# Patient Record
(demographics unavailable — no encounter records)

---

## 2024-12-30 NOTE — HISTORY OF PRESENT ILLNESS
[de-identified] : This is a 67 year F presenting to the office c/o ongoing Right shoulder pain following workplace injury on 10/28/24. 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 Patient reports that she was seated, and went to stand and feel on her Right side with immediate pain. Patient was first seen by Dr. Patel at [facility] where she had US, Xray and MRI of the Right shoulder, patient presents with imaging of the MRI at this time only. Patient reports significant pain in the anterior shoulder as well as the lateral shoulder. Patient has tried a sling with minimal relief. Patient has tried lidocaine patches with limited relief. Patient denies any numbness/tingling at this time and reports that since injury she noted no significant bruising.

## 2024-12-30 NOTE — DISCUSSION/SUMMARY
[de-identified] : This is a 67 year F presenting to the office c/o ongoing Right shoulder pain following workplace injury on 10/28/24. 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 Patient reports that she was seated, and went to stand and feel on her Right side with immediate pain. Patient was first seen by Dr. Patel at [facility] where she had US, Xray and MRI of the Right shoulder, patient presents with imaging of the MRI at this time only.  Xrays completed in office today and personally reviewed, show no significant bony abnormalities, no significant degenerative changes or high riding humeral head. MRI performed at [facility] and personally reviewed today show full thickness rotator cuff tear with associated atrophy.   Pt has a full thickness rotator cuff tear as injury to the biceps-labral complex and continues to report pain and weakness despite more than 3 months of conservative treatment including focused HEP/therapy, injections, anti-inflammatory medication and activity modification. The patient is interested in pursuing surgical treatment. We had a lengthy discussion about the nature of arthroscopic rotator cuff repair surgery including the likelihood of addressing other pathology with arthroscopic vs mini-open biceps tenodesis, subacromial decompression, and extensive debridement of any pathologic tissue encountered at the time of surgery. I discussed the risks and benefits of both operative and non-operative treatment. I explained in detail the postoperative recovery including the duration of sling use and expectation for postoperative physical therapy. Explained that there is no guarantee however there is a high likelihood of functional improvement and pain relief. The patient understood all this was discussed. The patient is indicated for a RIGHT shoulder arthroscopic rotator cuff repair, biceps tenodesis and subacromial decompression.  At this time the patient would like to delay surgical intervention for 3-4 months, discussed that with delay there is the risk of progression of muscle atrophy and retraction of the muscle.   All patient questions and concerns were answered to patients' satisfaction, patient will follow up in 6 weeks.  (Right) Rotator Cuff Tear Discussion for Patient Requesting Surgery. Pt has significant rotator cuff tear as injury to the biceps-labral complex and continues to report pain and weakness despite appropriate conservative treatment including focused HEP/therapy, injections, anti-inflammatory medication and activity modification. The patient is interested in pursuing surgical treatment. We had a lengthy discussion about the nature of arthroscopic rotator cuff repair surgery including the likelihood of addressing other pathology with arthroscopic Vs mini-open biceps tenodesis, subacromial decompression, and extensive debridement of any pathologic tissue encountered at the time of surgery.  I discussed the risks and benefits of both operative and non-operative treatment. I explained in detail the postoperative recovery including the duration of sling use and expectation for postoperative physical therapy. Explained that there is no guarantee however there is a high likelihood of functional improvement and pain relief. The patient understood all this was discussed.  The patient is indicated for a Right shoulder arthroscopic rotator cuff repair, biceps tenodesis, and subacromial decompression.  * Surgery: Considering patient has failed all conservative treatment we are requesting authorization for: -	Right shoulder arthroscopic rotator cuff repair (CPT code 75421), biceps retrieval with tenodesis (CPT 64023),  debridement (CPT 78617) and Subacromial decompression (98076),  -	 Pt would like surgery (after approval) The patient will require a [place] Arc 2.0 brace, cryotherapy, and 12 weeks of post-operative physical therapy. The patient will require a medical clearance  The doctor will require the [employer] representative, one hour, and one assistant.   (1) We discussed a comprehensive treatment plans that included a prescription management plan involving the use of prescription strength medications to include Ibuprofen 600-800 mg TID, versus 500-650 mg Tylenol. We also discussed prescribing topical diclofenac (Voltaren gel) as well as once daily MeloFxicam 15 mg.   (2) The patient has More Than One chronic injuries/illnesses as outlined, discussed, and documented by ICD 10 codes listed, as well as the HPI and Plan section.  There is a moderate risk of morbidity with further treatment, especially from use of prescription strength medications and possible side effects of these medications which include upset stomach and cardiac/renal issues with long term use were discussed.   (3) I recommended that the patient follow-up with their medical physician to discuss any significant specific potential issues with long term use such as interactions with current medications or with exacerbation of underlying medical morbidities.      Attestation: I, Natalio Romero, attest that this documentation has been prepared under the direction and in the presence of Provider Mark Jones MD. The documentation recorded by the scribe, in my presence, accurately reflects the service I personally performed, and the decisions made by me with my edits as appropriate. Mark Jones MD

## 2024-12-30 NOTE — WORK
[Sprain/Strain] : sprain/strain [Torn Ligament/Tendon/Muscle] : torn ligament, tendon or muscle [Was the competent medical cause of the injury] : was the competent medical cause of the injury [Are consistent with the injury] : are consistent with the injury [Consistent with my objective findings] : consistent with my objective findings [Severe Partial] : severe partial

## 2024-12-30 NOTE — DATA REVIEWED
[FreeTextEntry1] : Personally reviewed MRI imaging completed at [facility] 11/2024. Findings as follows:  Full thickness rotator cuff tear with notable atrophy of supraspinatus.

## 2024-12-30 NOTE — PHYSICAL EXAM
[Right] : right shoulder [There are no fractures, subluxations or dislocations. No significant abnormalities are seen] : There are no fractures, subluxations or dislocations. No significant abnormalities are seen [Type 2 acromion] : Type 2 acromion [No bony abnormalities] : No bony abnormalities [de-identified] : Constitutional: The general appearance of the patient is well developed, well nourished, no deformities and well groomed. Normal      Gait: Gait and function is as follows: normal gait.    Skin: Head and neck visualized skin is normal. Left upper extremity visualized skin is normal. Right upper extremity visualized skin is normal. Thoracic Skin of the thoracic spine shows visualized skin is normal.    Cardiovascular: palpable radial pulse bilaterally, good capillary refill in digits of the bilateral upper extremities and no temperature or color changes in the bilateral upper extremities.    Lymphatic: Normal Palpation of lymph nodes in the cervical.    Neurologic: fine motor control in the bilateral upper extremities is intact. Deep Tendon Reflexes in Upper and Lower Extremities Negative Recinos's in the bilateral upper extremities. The patient is oriented to time, place and person. Sensation to light touch intact in the bilateral upper extremities. Mood and Affect is normal.    LEFT Shoulder:  Inspection of the shoulder/upper arm is as follows: There is a full, pain-free, stable range of motion of the shoulder with normal strength and no tenderness to palpation.    RIGHT Shoulder: Inspection of the shoulder/upper arm is as follows: no scapula winging, no biceps deformity and no AC joint deformity. Palpation of the shoulder/upper arm is as follows: There is tenderness at the proximal biceps tendon. Range of motion of the shoulder is as follows: Pain with internal rotation, external rotation, abduction and forward flexion. Strength of the shoulder is as follows: Supraspinatus 4/5. External Rotation 4/5. Internal Rotation 4/5. Deltoid 5/5 Ligament Stability and Special Tests of the shoulder is as follows: Neer test is positive. Montes De Oca' test is positive. Speed's test is positive.    Neck:  Inspection / Palpation of the cervical spine is as follows: mild paracervical tenderness. Range of motion of the cervical spine is as follows: moderately decreased range of motion of the cervical spine. Stability testing for the cervical spine is as follows Stable range of motion.    Back, including spine: Inspection / Palpation of the thoracic/lumbar spine is as follows: There is a full, pain free, stable range of motion of the thoracic spine with a normal tone and not tenderness to palpation

## 2025-02-12 NOTE — HISTORY OF PRESENT ILLNESS
[de-identified] : This is a 67 year F presenting to the office c/o ongoing Right shoulder pain following workplace injury on 10/28/24. 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 Patient was first seen by Dr. Patel at [facility] where she had US, Xray and MRI of the Right shoulder, patient presents with imaging of the MRI at this time only. Patient reports significant pain in the anterior shoulder as well as the lateral shoulder. Patient has tried a sling with minimal relief. Patient has tried lidocaine patches with limited relief. Patient denies any numbness/tingling at this time and reports that since injury she noted no significant bruising.   2/12/25: Patient here for right shoulder pain. Patient reports surgery was recently granted through workers compensation. Pt wishes to discuss postoperative restrictions with surgery.  Patient reports that her left shoulder is now beginning to bother her as result of using it more since her right shoulder was injured.

## 2025-02-12 NOTE — DATA REVIEWED
[FreeTextEntry1] : Personally reviewed MRI imaging completed at [facility] 11/2024. Findings as follows:  Full thickness rotator cuff tear with notable atrophy of supraspinatus.

## 2025-02-12 NOTE — PHYSICAL EXAM
[Right] : right shoulder [There are no fractures, subluxations or dislocations. No significant abnormalities are seen] : There are no fractures, subluxations or dislocations. No significant abnormalities are seen [Type 2 acromion] : Type 2 acromion [No bony abnormalities] : No bony abnormalities [de-identified] : Constitutional: The general appearance of the patient is well developed, well nourished, no deformities and well groomed. Normal      Gait: Gait and function is as follows: normal gait.    Skin: Head and neck visualized skin is normal. Left upper extremity visualized skin is normal. Right upper extremity visualized skin is normal. Thoracic Skin of the thoracic spine shows visualized skin is normal.    Cardiovascular: palpable radial pulse bilaterally, good capillary refill in digits of the bilateral upper extremities and no temperature or color changes in the bilateral upper extremities.    Lymphatic: Normal Palpation of lymph nodes in the cervical.    Neurologic: fine motor control in the bilateral upper extremities is intact. Deep Tendon Reflexes in Upper and Lower Extremities Negative Recinos's in the bilateral upper extremities. The patient is oriented to time, place and person. Sensation to light touch intact in the bilateral upper extremities. Mood and Affect is normal.    LEFT Shoulder:  Inspection of the shoulder/upper arm is as follows: pain with resisted ER/IR. Pain with abduction   RIGHT Shoulder: Inspection of the shoulder/upper arm is as follows: no scapula winging, no biceps deformity and no AC joint deformity. Palpation of the shoulder/upper arm is as follows: There is tenderness at the proximal biceps tendon. Range of motion of the shoulder is as follows: Pain with internal rotation, external rotation, abduction and forward flexion. Strength of the shoulder is as follows: Supraspinatus 4/5. External Rotation 4/5. Internal Rotation 4/5. Deltoid 5/5 Ligament Stability and Special Tests of the shoulder is as follows: Neer test is positive. Montes De Oca' test is positive. Speed's test is positive.    Neck:  Inspection / Palpation of the cervical spine is as follows: mild paracervical tenderness. Range of motion of the cervical spine is as follows: moderately decreased range of motion of the cervical spine. Stability testing for the cervical spine is as follows Stable range of motion.    Back, including spine: Inspection / Palpation of the thoracic/lumbar spine is as follows: There is a full, pain free, stable range of motion of the thoracic spine with a normal tone and not tenderness to palpation

## 2025-02-12 NOTE — DISCUSSION/SUMMARY
[de-identified] : This is a 67 year F presenting to the office c/o ongoing Right shoulder pain following workplace injury on 10/28/24.  I personally reviewed XR, show no significant bony abnormalities, no significant degenerative changes or high riding humeral head. [facility] performed at [facility] and personally reviewed show full thickness rotator cuff tear approximately 1 cm x 1 cm of the supraspinatus and upper one third of the subscapularis.  We did explain that full-thickness tears specifically in this location tend to progress in size and therefore he could become unfixable at some point in the future yielding permanent impairment.  Strongly advised that she undergo arthroscopic rotator cuff repair and biceps tenodesis to prevent rotator cuff muscle atrophy and the development of an irreparable rotator cuff tear. Patient expresses some apprehension and anxiety related to the procedure. Patient to consider whether or not she should proceed.  Patient reports her left shoulder has become painful since compensating for the right shoulder which is causally related to the work injury on 10/28/24 she reports difficulty putting on her coat and other activities of daily living as result of her increased left shoulder pain..  Further workup would be provided if this is established on the case.    Pt has a full thickness rotator cuff tear as injury to the biceps-labral complex and continues to report pain and weakness despite more than 3 months of conservative treatment including focused HEP/therapy, injections, anti-inflammatory medication and activity modification. The patient is interested in pursuing surgical treatment. We had a lengthy discussion about the nature of arthroscopic rotator cuff repair surgery including the likelihood of addressing other pathology with arthroscopic vs mini-open biceps tenodesis, subacromial decompression, and extensive debridement of any pathologic tissue encountered at the time of surgery. I discussed the risks and benefits of both operative and non-operative treatment. I explained in detail the postoperative recovery including the duration of sling use and expectation for postoperative physical therapy. Explained that there is no guarantee however there is a high likelihood of functional improvement and pain relief. The patient understood all this was discussed. The patient is indicated for a RIGHT shoulder arthroscopic rotator cuff repair, biceps tenodesis and subacromial decompression.  At this time the patient would like to delay surgical intervention for 3-4 months, discussed that with delay there is the risk of progression of muscle atrophy and retraction of the muscle.   All patient questions and concerns were answered to patients' satisfaction, patient will follow up in 6 weeks.  (Right) Rotator Cuff Tear Discussion for Patient Requesting Surgery. Pt has significant rotator cuff tear as injury to the biceps-labral complex and continues to report pain and weakness despite appropriate conservative treatment including focused HEP/therapy, injections, anti-inflammatory medication and activity modification. The patient is interested in pursuing surgical treatment. We had a lengthy discussion about the nature of arthroscopic rotator cuff repair surgery including the likelihood of addressing other pathology with arthroscopic Vs mini-open biceps tenodesis, subacromial decompression, and extensive debridement of any pathologic tissue encountered at the time of surgery.  I discussed the risks and benefits of both operative and non-operative treatment. I explained in detail the postoperative recovery including the duration of sling use and expectation for postoperative physical therapy. Explained that there is no guarantee however there is a high likelihood of functional improvement and pain relief. The patient understood all this was discussed.  The patient is indicated for a Right shoulder arthroscopic rotator cuff repair, biceps tenodesis, and subacromial decompression.  * Surgery: Considering patient has failed all conservative treatment we are requesting authorization for: -	Right shoulder arthroscopic rotator cuff repair (CPT code 45943), biceps retrieval with tenodesis (CPT 54654),  debridement (CPT 98888) and Subacromial decompression (88168),  -	 Pt would like surgery (after approval) The patient will require a [place] Arc 2.0 brace, cryotherapy, and 12 weeks of post-operative physical therapy. The patient will require a medical clearance  The doctor will require the [employer] representative, one hour, and one assistant.   (1) We discussed a comprehensive treatment plans that included a prescription management plan involving the use of prescription strength medications to include Ibuprofen 600-800 mg TID, versus 500-650 mg Tylenol. We also discussed prescribing topical diclofenac (Voltaren gel) as well as once daily MeloFxicam 15 mg.   (2) The patient has More Than One chronic injuries/illnesses as outlined, discussed, and documented by ICD 10 codes listed, as well as the HPI and Plan section.  There is a moderate risk of morbidity with further treatment, especially from use of prescription strength medications and possible side effects of these medications which include upset stomach and cardiac/renal issues with long term use were discussed.   (3) I recommended that the patient follow-up with their medical physician to discuss any significant specific potential issues with long term use such as interactions with current medications or with exacerbation of underlying medical morbidities.      Attestation: I, Rubi Moody, attest that this documentation has been prepared under the direction and in the presence of Provider Mark Jones MD. The documentation recorded by the scribe, in my presence, accurately reflects the service I personally performed, and the decisions made by me with my edits as appropriate. Mark Jones MD

## 2025-03-26 NOTE — HISTORY OF PRESENT ILLNESS
[de-identified] : This is a 67 year F presenting to the office c/o ongoing Right shoulder pain following workplace injury on 10/28/24. 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 Patient was first seen by Dr. Patel at [facility] where she had US, Xray and MRI of the Right shoulder, patient presents with imaging of the MRI at this time only. Patient reports significant pain in the anterior shoulder as well as the lateral shoulder. Patient has tried a sling with minimal relief. Patient has tried lidocaine patches with limited relief. Patient denies any numbness/tingling at this time and reports that since injury she noted no significant bruising.   2/12/25: Patient here for right shoulder pain. Patient reports surgery was recently granted through workers compensation. Pt wishes to discuss postoperative restrictions with surgery.  Patient reports that her left shoulder is now beginning to bother her as result of using it more since her right shoulder was injured.  3/26/25: Patient returns today for a follow-up of right shoulder pain.  She has known full-thickness rotator cuff tear as result of a work-related injury.  Patient does continue to note significant retroscapular pain as well.  Previously indicated for surgery and was granted authorization however patient wishes to continue with conservative management.

## 2025-03-26 NOTE — DATA REVIEWED
[FreeTextEntry1] : Personally reviewed MRI imaging completed at [facility] 11/2024. Findings as follows:  Full thickness rotator cuff tear with notable atrophy of supraspinatus.

## 2025-03-26 NOTE — PHYSICAL EXAM
[de-identified] : Constitutional: The general appearance of the patient is well developed, well nourished, no deformities and well groomed. Normal      Gait: Gait and function is as follows: normal gait.    Skin: Head and neck visualized skin is normal. Left upper extremity visualized skin is normal. Right upper extremity visualized skin is normal. Thoracic Skin of the thoracic spine shows visualized skin is normal.    Cardiovascular: palpable radial pulse bilaterally, good capillary refill in digits of the bilateral upper extremities and no temperature or color changes in the bilateral upper extremities.    Lymphatic: Normal Palpation of lymph nodes in the cervical.    Neurologic: fine motor control in the bilateral upper extremities is intact. Deep Tendon Reflexes in Upper and Lower Extremities Negative Recinos's in the bilateral upper extremities. The patient is oriented to time, place and person. Sensation to light touch intact in the bilateral upper extremities. Mood and Affect is normal.    LEFT Shoulder:  Inspection of the shoulder/upper arm is as follows: pain with resisted ER/IR. Pain with abduction   RIGHT Shoulder: Inspection of the shoulder/upper arm is as follows: no scapula winging, no biceps deformity and no AC joint deformity. Palpation of the shoulder/upper arm is as follows: There is tenderness at the proximal biceps tendon. Range of motion of the shoulder is as follows: Pain with internal rotation, external rotation, abduction and forward flexion. Strength of the shoulder is as follows: Supraspinatus 4/5. External Rotation 4/5. Internal Rotation 4/5. Deltoid 5/5 Ligament Stability and Special Tests of the shoulder is as follows: Neer test is positive. Montes De Oca' test is positive. Speed's test is positive.    Neck:  Inspection / Palpation of the cervical spine is as follows: mild paracervical tenderness. Range of motion of the cervical spine is as follows: moderately decreased range of motion of the cervical spine. Stability testing for the cervical spine is as follows Stable range of motion.    Back, including spine: Inspection / Palpation of the thoracic/lumbar spine is as follows: There is a full, pain free, stable range of motion of the thoracic spine with a normal tone and not tenderness to palpation

## 2025-03-26 NOTE — DISCUSSION/SUMMARY
[de-identified] : This is a 67 year F presenting to the office c/o ongoing Right shoulder pain following workplace injury on 10/28/24.  I personally reviewed XR, show no significant bony abnormalities, no significant degenerative changes or high riding humeral head. [facility] performed [facility] and personally reviewed show full thickness rotator cuff tear approximately 1 cm x 1 cm of the supraspinatus and upper one third of the subscapularis.  We did explain that full-thickness tears specifically in this location tend to progress in size and therefore he could become unfixable at some point in the future yielding permanent impairment.  Strongly advised that she undergo arthroscopic rotator cuff repair and biceps tenodesis to prevent rotator cuff muscle atrophy and the development of an irreparable rotator cuff tear. Patient expresses some apprehension and anxiety related to the procedure Including concern over anesthesia. At this point she wishes to continue with conservative management.   Patient reports her left shoulder has become painful since compensating for the right shoulder which is causally related to the work injury on 10/28/24 she reports difficulty putting on her coat and other activities of daily living as result of her increased left shoulder pain..  Further workup would be provided  Including x-ray and MRI if this is established on the case.   Pt has a full thickness rotator cuff tear as injury to the biceps-labral complex and continues to report pain and weakness despite more than 3 months of conservative treatment including focused HEP/therapy, injections, anti-inflammatory medication and activity modification. The patient is interested in pursuing surgical treatment. We had a lengthy discussion about the nature of arthroscopic rotator cuff repair surgery including the likelihood of addressing other pathology with arthroscopic vs mini-open biceps tenodesis, subacromial decompression, and extensive debridement of any pathologic tissue encountered at the time of surgery. I discussed the risks and benefits of both operative and non-operative treatment. I explained in detail the postoperative recovery including the duration of sling use and expectation for postoperative physical therapy. Explained that there is no guarantee however there is a high likelihood of functional improvement and pain relief. The patient understood all this was discussed. The patient is indicated for a RIGHT shoulder arthroscopic rotator cuff repair, biceps tenodesis and subacromial decompression.  At this time the patient would like to delay surgical intervention for 3-4 months, discussed that with delay there is the risk of progression of muscle atrophy and retraction of the muscle.   All patient questions and concerns were answered to patients' satisfaction, patient will follow up in 6 weeks.  (Right) Rotator Cuff Tear Discussion for Patient Requesting Surgery. Pt has significant rotator cuff tear as injury to the biceps-labral complex and continues to report pain and weakness despite appropriate conservative treatment including focused HEP/therapy, injections, anti-inflammatory medication and activity modification. The patient is interested in pursuing surgical treatment. We had a lengthy discussion about the nature of arthroscopic rotator cuff repair surgery including the likelihood of addressing other pathology with arthroscopic Vs mini-open biceps tenodesis, subacromial decompression, and extensive debridement of any pathologic tissue encountered at the time of surgery.  I discussed the risks and benefits of both operative and non-operative treatment. I explained in detail the postoperative recovery including the duration of sling use and expectation for postoperative physical therapy. Explained that there is no guarantee however there is a high likelihood of functional improvement and pain relief. The patient understood all this was discussed.  The patient is indicated for a Right shoulder arthroscopic rotator cuff repair, biceps tenodesis, and subacromial decompression.  * Surgery: Considering patient has failed all conservative treatment we are requesting authorization for: -	Right shoulder arthroscopic rotator cuff repair (CPT code 36201), biceps retrieval with tenodesis (CPT 38078),  debridement (CPT 36216) and Subacromial decompression (51039),  -	 Pt would like surgery (after approval) The patient will require a [place] Arc 2.0 brace, cryotherapy, and 12 weeks of post-operative physical therapy. The patient will require a medical clearance  The doctor will require the [facility] representative, one hour, and one assistant.   (1) We discussed a comprehensive treatment plans that included a prescription management plan involving the use of prescription strength medications to include Ibuprofen 600-800 mg TID, versus 500-650 mg Tylenol. We also discussed prescribing topical diclofenac (Voltaren gel) as well as once daily MeloFxicam 15 mg.   (2) The patient has More Than One chronic injuries/illnesses as outlined, discussed, and documented by ICD 10 codes listed, as well as the HPI and Plan section.  There is a moderate risk of morbidity with further treatment, especially from use of prescription strength medications and possible side effects of these medications which include upset stomach and cardiac/renal issues with long term use were discussed.   (3) I recommended that the patient follow-up with their medical physician to discuss any significant specific potential issues with long term use such as interactions with current medications or with exacerbation of underlying medical morbidities.      Attestation: I, Rubi SPIVEY'Mery, attest that this documentation has been prepared under the direction and in the presence of Provider Mark Jones MD. The documentation recorded by the scribe, in my presence, accurately reflects the service I personally performed, and the decisions made by me with my edits as appropriate. Mark Jones MD